# Patient Record
Sex: FEMALE | Race: WHITE | NOT HISPANIC OR LATINO | Employment: FULL TIME
[De-identification: names, ages, dates, MRNs, and addresses within clinical notes are randomized per-mention and may not be internally consistent; named-entity substitution may affect disease eponyms.]

---

## 2017-09-03 ENCOUNTER — HEALTH MAINTENANCE LETTER (OUTPATIENT)
Age: 42
End: 2017-09-03

## 2018-09-10 ENCOUNTER — HEALTH MAINTENANCE LETTER (OUTPATIENT)
Age: 43
End: 2018-09-10

## 2019-10-25 ENCOUNTER — OFFICE VISIT (OUTPATIENT)
Dept: SLEEP MEDICINE | Facility: CLINIC | Age: 44
End: 2019-10-25
Payer: COMMERCIAL

## 2019-10-25 VITALS
HEART RATE: 70 BPM | SYSTOLIC BLOOD PRESSURE: 112 MMHG | OXYGEN SATURATION: 100 % | HEIGHT: 64 IN | WEIGHT: 218.8 LBS | DIASTOLIC BLOOD PRESSURE: 75 MMHG | BODY MASS INDEX: 37.36 KG/M2

## 2019-10-25 DIAGNOSIS — E66.01 MORBID OBESITY (H): ICD-10-CM

## 2019-10-25 DIAGNOSIS — R06.83 PRIMARY SNORING: ICD-10-CM

## 2019-10-25 PROCEDURE — 99204 OFFICE O/P NEW MOD 45 MIN: CPT | Performed by: FAMILY MEDICINE

## 2019-10-25 ASSESSMENT — MIFFLIN-ST. JEOR: SCORE: 1632.47

## 2019-10-28 PROBLEM — R06.83 PRIMARY SNORING: Status: ACTIVE | Noted: 2019-10-28

## 2019-10-28 PROBLEM — E66.01 MORBID OBESITY (H): Status: ACTIVE | Noted: 2019-10-28

## 2019-10-28 NOTE — PROGRESS NOTES
Sleep Consultation:    Date on this visit: 10/25/2019    Jessica Dawson is sent by No ref. provider found for a sleep consultation regarding sleep disordered breathing.    Primary Physician: Kevin Soto     CC: snoring     HPI: Jessica Dawson is a 42 yo female with metabolic syndrome who presents with snoring.     She has been snoring for an unknown duration of time but it has interrupted her bed partners sleep. Snoring worse when laying on back, unclear if improved when laying on side. No daytime fatigue or tiredness. No observed apneic episodes per bed partner. She has light, morning headaches but equates them to her seasonal allergies which are most prominent in the fall.     Mother and father both have HARSHAL. Mother uses CPAP. Father uses oral appliance. No history of dental procedures. No history of hypertension.    Allergies:    Allergies   Allergen Reactions     Nkda [No Known Drug Allergies]        Medications:    Current Outpatient Medications   Medication Sig Dispense Refill     NO ACTIVE MEDICATIONS .       sulfamethoxazole-trimethoprim (BACTRIM,SEPTRA) 400-80 MG per tablet Take 2 tablets by mouth 2 times daily 14 tablet 0       Problem List:  Patient Active Problem List    Diagnosis Date Noted     CARDIOVASCULAR SCREENING; LDL GOAL LESS THAN 160 10/31/2010     Priority: Medium     Metabolic syndrome 10/20/2009     Priority: Medium        Past Medical/Surgical History:  Past Medical History:   Diagnosis Date     MEDICAL HISTORY OF - 9/2000    Lateral femoral cutaneous nerve syndrome. Pre-pregnancy counseling.     Past Surgical History:   Procedure Laterality Date     NO HISTORY OF SURGERY         Social History:  Social History     Socioeconomic History     Marital status:      Spouse name: Jin Dawson     Number of children: 0     Years of education: 14+     Highest education level: Not on file   Occupational History     Occupation: [employer] Supervisor     Employer: [employer]  "  Social Needs     Financial resource strain: Not on file     Food insecurity:     Worry: Not on file     Inability: Not on file     Transportation needs:     Medical: Not on file     Non-medical: Not on file   Tobacco Use     Smoking status: Never Smoker   Substance and Sexual Activity     Alcohol use: Yes     Drug use: No     Sexual activity: Yes     Partners: Male   Lifestyle     Physical activity:     Days per week: Not on file     Minutes per session: Not on file     Stress: Not on file   Relationships     Social connections:     Talks on phone: Not on file     Gets together: Not on file     Attends Orthodox service: Not on file     Active member of club or organization: Not on file     Attends meetings of clubs or organizations: Not on file     Relationship status: Not on file     Intimate partner violence:     Fear of current or ex partner: Not on file     Emotionally abused: Not on file     Physically abused: Not on file     Forced sexual activity: Not on file   Other Topics Concern     Parent/sibling w/ CABG, MI or angioplasty before 65F 55M? Not Asked   Social History Narrative     Not on file       Family History:  Family History   Problem Relation Age of Onset     Diabetes Mother      Allergies Mother      Thyroid Disease Mother         nodule removed from thyroid on LEFT side     Diabetes Father      Respiratory Maternal Grandmother         Emphazema     Alcohol/Drug Maternal Grandfather      Diabetes Paternal Grandfather      Hypertension Sister      Asthma No family hx of      C.A.D. No family hx of      Cerebrovascular Disease No family hx of      Breast Cancer No family hx of      Cancer - colorectal No family hx of      Prostate Cancer No family hx of      Thyroid Disease Other         had thyroid \"shurnk\"/nodule on LEFT side of thyroid removed     Thyroid Disease Other         thyroid \"shrunk\"       Review of Systems:  A complete review of systems reviewed by me is negative with the exeption of " "what has been mentioned in the history of present illness.    Physical Examination:  Vitals: /75   Pulse 70   Ht 1.626 m (5' 4\")   Wt 99.2 kg (218 lb 12.8 oz)   SpO2 100%   BMI 37.56 kg/m    BMI= Body mass index is 37.56 kg/m .    Neck Cir (cm): 38 cm    [place] Total Score 10/25/2019   Total score - [place] 0       NAHOMY Total Score: 3 (10/25/19 0800)    General: well-appearing, no distress  HEENT: normal oropharynx  Resp: lungs clear to auscultation  CV: normal rate, regular rhythm, normal S1,S2 without extra heart sounds    Impression/Plan:    1) Primary Snoring  44 yo female with snoring of unknown duration. STOP BANG of 2. Low concern for HARSHAL. Discussed options to treat primary snoring including oral appliances, flonase, and positional therapy. Will plan for an over the counter oral appliance.    Scribe Disclosure:   ISteven, MS4, am serving as a scribe; to document services personally performed by Dr. Hans De Anda, based on data collection and the provider's statements to me.     Provider Disclosure:  IHans, agree with above History, Review of Systems, Physical exam and Plan.  I have reviewed the content of the documentation and have edited it as needed. I have personally performed the services documented here and the documentation accurately represents those services and the decisions I have made.      I have spent 45 minutes with this patient today in which greater than 50% of this time was spent in the counseling / coordination of care.      Hans De Anda MD      CC: No ref. provider found        "

## 2021-05-26 ENCOUNTER — RECORDS - HEALTHEAST (OUTPATIENT)
Dept: ADMINISTRATIVE | Facility: CLINIC | Age: 46
End: 2021-05-26

## 2023-02-03 ENCOUNTER — ANCILLARY ORDERS (OUTPATIENT)
Dept: FAMILY MEDICINE | Facility: CLINIC | Age: 48
End: 2023-02-03

## 2023-02-03 DIAGNOSIS — Z12.31 VISIT FOR SCREENING MAMMOGRAM: ICD-10-CM

## 2023-02-17 ENCOUNTER — ANCILLARY ORDERS (OUTPATIENT)
Dept: RADIOLOGY | Facility: CLINIC | Age: 48
End: 2023-02-17

## 2023-02-17 ENCOUNTER — ANCILLARY ORDERS (OUTPATIENT)
Dept: FAMILY MEDICINE | Facility: CLINIC | Age: 48
End: 2023-02-17

## 2023-02-17 ENCOUNTER — ANCILLARY PROCEDURE (OUTPATIENT)
Dept: MAMMOGRAPHY | Facility: CLINIC | Age: 48
End: 2023-02-17
Attending: FAMILY MEDICINE
Payer: COMMERCIAL

## 2023-02-17 DIAGNOSIS — Z12.31 VISIT FOR SCREENING MAMMOGRAM: ICD-10-CM

## 2023-02-17 DIAGNOSIS — N64.89 BREAST ASYMMETRY: ICD-10-CM

## 2023-02-17 PROCEDURE — 77067 SCR MAMMO BI INCL CAD: CPT

## 2024-11-08 ENCOUNTER — ANCILLARY PROCEDURE (OUTPATIENT)
Dept: MAMMOGRAPHY | Facility: CLINIC | Age: 49
End: 2024-11-08
Attending: FAMILY MEDICINE
Payer: COMMERCIAL

## 2024-11-08 DIAGNOSIS — Z12.31 VISIT FOR SCREENING MAMMOGRAM: ICD-10-CM

## 2024-11-08 PROCEDURE — 77063 BREAST TOMOSYNTHESIS BI: CPT

## 2024-11-22 ENCOUNTER — TELEPHONE (OUTPATIENT)
Dept: FAMILY MEDICINE | Facility: CLINIC | Age: 49
End: 2024-11-22
Payer: COMMERCIAL

## 2024-11-22 DIAGNOSIS — Z12.11 COLON CANCER SCREENING: Primary | ICD-10-CM

## 2024-11-22 NOTE — TELEPHONE ENCOUNTER
Order/Referral Request    Who is requesting: Patient    Orders being requested: Colonoscopy    Reason service is needed/diagnosis: prevenative    When are orders needed by: ASAP    Has this been discussed with Provider: N/A    Does patient have a preference on a Group/Provider/Facility? [facility]    Does patient have an appointment scheduled?: No    Where to send orders: Place orders within Epic    Okay to leave a detailed message?: Yes at Cell number on file:    Telephone Information:   Mobile [phone]

## 2024-12-04 ENCOUNTER — TRANSCRIBE ORDERS (OUTPATIENT)
Dept: OTHER | Age: 49
End: 2024-12-04

## 2024-12-04 DIAGNOSIS — Z12.11 SPECIAL SCREENING FOR MALIGNANT NEOPLASMS, COLON: Primary | ICD-10-CM

## 2024-12-16 ENCOUNTER — TELEPHONE (OUTPATIENT)
Dept: GASTROENTEROLOGY | Facility: CLINIC | Age: 49
End: 2024-12-16
Payer: COMMERCIAL

## 2024-12-16 NOTE — TELEPHONE ENCOUNTER
Endoscopy Scheduling Screen      What insurance is in the chart?  Other:  [employer]    Ordering/Referring Provider: Kevin zuniga   (If ordering provider performs procedure, schedule with ordering provider unless otherwise instructed. )    BMI: There is no height or weight on file to calculate BMI.     Sedation Ordered  general anesthesia.   BMI<= 45 45 < BMI <= 48 48 < BMI < = 50  BMI > 50   No Restrictions No [facility]  No [facility]  [facility] with exceptions Hospital Only OR Only       Do you have a history of malignant hyperthermia?  No    (Females) Are you currently pregnant?   No     Are you currently on dialysis?   No    Do you need assistance transferring?   No    BMI: There is no height or weight on file to calculate BMI. No BMI on file since 2019 37.56    Is patients BMI > 50?  No    BMI > 40?  No    Do you have a diagnosis of diabetes?  No    Do you take an injectable medication for weight loss or diabetes (excluding insulin)?  No    Do you take the medication Naltrexone?  No    Do you take blood thinners?  No     Prep   Are you currently on dialysis or do you have chronic kidney disease?  No    Do you have a diagnosis of cystic fibrosis (CF)?  No    On a regular basis do you go 3 -5 days between bowel movements?  No    Preferred Pharmacy:  No Pharmacies Listed    Final Scheduling Details     Procedure scheduled  Colonoscopy    Surgeon:  William     Date of procedure:  3-3-25     Location  [place] - Patient preference.    What is your communication preference for Instructions and/or Bowel Prep?   [facility]    Patient Reminders:    You will receive a call from a Nurse to review instructions and health history.  This assessment must be completed prior to your procedure.  Failure to complete the Nurse assessment may result in the procedure being cancelled.       On the day of your procedure, please designate an adult(s) who can drive you home stay with you for the next 24 hours. The medicines used in the exam will make  you sleepy. You will not be able to drive.       You cannot take public transportation, ride share services, or non-medical taxi service without a responsible caregiver.  Medical transport services are allowed with the requirement that a responsible caregiver will receive you at your destination.  We require that drivers and caregivers are confirmed prior to your procedure.

## 2025-02-24 ENCOUNTER — ANESTHESIA EVENT (OUTPATIENT)
Dept: GASTROENTEROLOGY | Facility: CLINIC | Age: 50
End: 2025-02-24
Payer: COMMERCIAL

## 2025-02-24 NOTE — ANESTHESIA PREPROCEDURE EVALUATION
"Anesthesia Pre-Procedure Evaluation    Patient: Jessica Dawson   MRN: [id number] : 1975        Procedure : Procedure(s):  Colonoscopy          Past Medical History:   Diagnosis Date    MEDICAL HISTORY OF 2000    Lateral femoral cutaneous nerve syndrome. Pre-pregnancy counseling.      Past Surgical History:   Procedure Laterality Date    NO HISTORY OF SURGERY        Allergies   Allergen Reactions    Nkda [No Known Drug Allergy]       Social History     Tobacco Use    Smoking status: Never    Smokeless tobacco: Not on file   Substance Use Topics    Alcohol use: Yes      Wt Readings from Last 1 Encounters:   10/25/19 99.2 kg (218 lb 12.8 oz)        Anesthesia Evaluation            ROS/MED HX  ENT/Pulmonary:     (+)     HARSHAL risk factors, snores loudly,                                  Neurologic:       Cardiovascular:       METS/Exercise Tolerance:     Hematologic:       Musculoskeletal:       GI/Hepatic:       Renal/Genitourinary:       Endo: Comment: Metabolic syndrome    (+)               Obesity,       Psychiatric/Substance Use:       Infectious Disease:       Malignancy:       Other:            Physical Exam    Airway        Mallampati: II   TM distance: > 3 FB   Neck ROM: full   Mouth opening: > 3 cm    Respiratory Devices and Support  Comment: Not on oxygen currently       Dental  no notable dental history         Cardiovascular   cardiovascular exam normal          Pulmonary   pulmonary exam normal                OUTSIDE LABS:  CBC:   Lab Results   Component Value Date    HGB 12.6 2007     BMP:   Lab Results   Component Value Date     10/20/2009     2007    POTASSIUM 3.8 10/20/2009    POTASSIUM 3.8 2007    CHLORIDE 104 10/20/2009    CHLORIDE 108 2007    CO2 29 10/20/2009    CO2 27 2007    BUN 8 10/20/2009    BUN 10 2007    CR 0.72 10/20/2009    CR 0.83 2007    GLC 82 10/20/2009    GLC 84 2007     COAGS: No results found for: \"PTT\", \"INR\", " "\"FIBR\"  POC: No results found for: \"BGM\", \"HCG\", \"HCGS\"  HEPATIC: No results found for: \"ALBUMIN\", \"PROTTOTAL\", \"ALT\", \"AST\", \"GGT\", \"ALKPHOS\", \"BILITOTAL\", \"BILIDIRECT\", \"NAINA\"  OTHER:   Lab Results   Component Value Date    A1C 5.6 12/20/2024    ADRIANNE 9.0 10/20/2009    TSH 1.94 12/20/2024       Anesthesia Plan    ASA Status:  2       Anesthesia Type: General.   Induction: Intravenous, Propofol.   Maintenance: TIVA.        Consents    Anesthesia Plan(s) and associated risks, benefits, and realistic alternatives discussed. Questions answered and patient/representative(s) expressed understanding.     - Discussed: Risks, Benefits and Alternatives for BOTH SEDATION and the PROCEDURE were discussed     - Discussed with:  Patient            Postoperative Care    Pain management: IV analgesics, Oral pain medications, Multi-modal analgesia.   PONV prophylaxis: Ondansetron (or other 5HT-3), Dexamethasone or Solumedrol     Comments:    Other Comments: Patient aware of plan, what to expect, and potential risks. Timeout was performed before bringing the patient back to OR, and once again, patient was asked if they had any questions           CLAUDE Baird CRNA    I have reviewed the pertinent notes and labs in the chart from the past 30 days and (re)examined the patient.  Any updates or changes from those notes are reflected in this note.    Clinically Significant Risk Factors Present on Admission                                          "

## 2025-02-27 RX ORDER — THERA TABS 400 MCG
1 TAB ORAL DAILY
COMMUNITY

## 2025-03-03 ENCOUNTER — ANESTHESIA (OUTPATIENT)
Dept: GASTROENTEROLOGY | Facility: CLINIC | Age: 50
End: 2025-03-03
Payer: COMMERCIAL

## 2025-03-03 ENCOUNTER — HOSPITAL ENCOUNTER (OUTPATIENT)
Facility: CLINIC | Age: 50
Discharge: HOME | End: 2025-03-03
Attending: SURGERY | Admitting: SURGERY
Payer: COMMERCIAL

## 2025-03-03 VITALS
DIASTOLIC BLOOD PRESSURE: 115 MMHG | HEIGHT: 64 IN | SYSTOLIC BLOOD PRESSURE: 146 MMHG | HEART RATE: 65 BPM | RESPIRATION RATE: 15 BRPM | TEMPERATURE: 98.1 F | OXYGEN SATURATION: 93 % | WEIGHT: 218 LBS | BODY MASS INDEX: 37.22 KG/M2

## 2025-03-03 LAB — COLONOSCOPY: NORMAL

## 2025-03-03 PROCEDURE — G0121 COLON CA SCRN NOT HI RSK IND: HCPCS | Performed by: SURGERY

## 2025-03-03 PROCEDURE — 258N000003 HC RX IP 258 OP 636: Performed by: PHYSICIAN ASSISTANT

## 2025-03-03 PROCEDURE — 250N000009 HC RX 250: Performed by: PHYSICIAN ASSISTANT

## 2025-03-03 PROCEDURE — 45378 DIAGNOSTIC COLONOSCOPY: CPT | Performed by: SURGERY

## 2025-03-03 PROCEDURE — 370N000017 HC ANESTHESIA TECHNICAL FEE, PER MIN: Performed by: SURGERY

## 2025-03-03 PROCEDURE — 250N000011 HC RX IP 250 OP 636

## 2025-03-03 RX ORDER — SODIUM CHLORIDE, SODIUM LACTATE, POTASSIUM CHLORIDE, CALCIUM CHLORIDE 600; 310; 30; 20 MG/100ML; MG/100ML; MG/100ML; MG/100ML
INJECTION, SOLUTION INTRAVENOUS CONTINUOUS
Status: DISCONTINUED | OUTPATIENT
Start: 2025-03-03 | End: 2025-03-03 | Stop reason: HOSPADM

## 2025-03-03 RX ORDER — LIDOCAINE 40 MG/G
CREAM TOPICAL
Status: DISCONTINUED | OUTPATIENT
Start: 2025-03-03 | End: 2025-03-03 | Stop reason: HOSPADM

## 2025-03-03 RX ORDER — PROPOFOL 10 MG/ML
INJECTION, EMULSION INTRAVENOUS PRN
Status: DISCONTINUED | OUTPATIENT
Start: 2025-03-03 | End: 2025-03-03

## 2025-03-03 RX ORDER — ONDANSETRON 2 MG/ML
4 INJECTION INTRAMUSCULAR; INTRAVENOUS EVERY 30 MIN PRN
Status: DISCONTINUED | OUTPATIENT
Start: 2025-03-03 | End: 2025-03-03 | Stop reason: HOSPADM

## 2025-03-03 RX ORDER — ONDANSETRON 4 MG/1
4 TABLET, ORALLY DISINTEGRATING ORAL EVERY 30 MIN PRN
Status: DISCONTINUED | OUTPATIENT
Start: 2025-03-03 | End: 2025-03-03 | Stop reason: HOSPADM

## 2025-03-03 RX ADMIN — SODIUM CHLORIDE, POTASSIUM CHLORIDE, SODIUM LACTATE AND CALCIUM CHLORIDE 1000 ML: 600; 310; 30; 20 INJECTION, SOLUTION INTRAVENOUS at 07:04

## 2025-03-03 RX ADMIN — PROPOFOL 200 MCG/KG/MIN: 10 INJECTION, EMULSION INTRAVENOUS at 07:29

## 2025-03-03 RX ADMIN — SODIUM CHLORIDE, POTASSIUM CHLORIDE, SODIUM LACTATE AND CALCIUM CHLORIDE: 600; 310; 30; 20 INJECTION, SOLUTION INTRAVENOUS at 07:26

## 2025-03-03 RX ADMIN — PROPOFOL 100 MG: 10 INJECTION, EMULSION INTRAVENOUS at 07:28

## 2025-03-03 RX ADMIN — LIDOCAINE HYDROCHLORIDE 0.1 ML: 10 INJECTION, SOLUTION EPIDURAL; INFILTRATION; INTRACAUDAL; PERINEURAL at 07:04

## 2025-03-03 ASSESSMENT — ACTIVITIES OF DAILY LIVING (ADL)
ADLS_ACUITY_SCORE: 41
ADLS_ACUITY_SCORE: 41

## 2025-03-03 NOTE — PROGRESS NOTES
[facility] DISCHARGE NOTE    Patient discharged to home at 8:26 AM via ambulation. Accompanied by spouse and staff. Discharge instructions reviewed with patient and spouse, opportunity offered to ask questions. Prescriptions - None ordered for discharge. All belongings sent with patient.    Sury Smith RN

## 2025-03-03 NOTE — H&P
[facility]    Pre-Endoscopy History and Physical     Jessica Dawson MRN# [id number]   YOB: 1975 Age: 49 year old     Date of Procedure: 3/3/2025  Primary care provider: Kevin Soto  Type of Endoscopy: Colonoscopy with possible biopsy, possible polypectomy  Reason for Procedure: Screening colonoscopy  Type of Anesthesia Anticipated: Conscious Sedation    HPI:    Jessica is a 49 year old female who will be undergoing the above procedure.  Colon 1st; no famhx of colon ca; no blood thinner     A history and physical has been performed. The patient's medications and allergies have been reviewed. The risks and benefits of the procedure and the sedation options and risks were discussed with the patient.  All questions were answered and informed consent was obtained.      She denies a personal or family history of anesthesia complications or bleeding disorders.    This colonoscopy is purely for screening purposes. Denies abdominal pain, nausea, emesis, diarrhea, constipation, hematochezia, melena.    Patient Active Problem List   Diagnosis    Metabolic syndrome    CARDIOVASCULAR SCREENING; LDL GOAL LESS THAN 160    Primary snoring    Obesity (BMI 35.0-39.9) with comorbidity (H)        Past Medical History:   Diagnosis Date    MEDICAL HISTORY OF - 9/2000    Lateral femoral cutaneous nerve syndrome. Pre-pregnancy counseling.        Past Surgical History:   Procedure Laterality Date    NO HISTORY OF SURGERY         Social History     Tobacco Use    Smoking status: Never    Smokeless tobacco: Not on file   Substance Use Topics    Alcohol use: Yes       Family History   Problem Relation Age of Onset    Diabetes Mother     Allergies Mother     Thyroid Disease Mother         nodule removed from thyroid on LEFT side    Diabetes Father     Respiratory Maternal Grandmother         Emphazema    Alcohol/Drug Maternal Grandfather     Diabetes Paternal Grandfather     Hypertension Sister      "Asthma No family hx of     C.A.D. No family hx of     Cerebrovascular Disease No family hx of     Breast Cancer No family hx of     Cancer - colorectal No family hx of     Prostate Cancer No family hx of     Thyroid Disease Other         had thyroid \"shurnk\"/nodule on LEFT side of thyroid removed    Thyroid Disease Other         thyroid \"shrunk\"       Prior to Admission medications    Medication Sig Start Date End Date Taking? Authorizing Provider   multivitamin, therapeutic (THERA-VIT) TABS tablet Take 1 tablet by mouth daily.   Yes Reported, Patient   NO ACTIVE MEDICATIONS .    Reported, Patient       Allergies   Allergen Reactions    Nkda [No Known Drug Allergy]         REVIEW OF SYSTEMS:   5 point ROS negative except as noted above in HPI, including Gen., Resp., CV, GI &  system review.    PHYSICAL EXAM:   /85 (BP Location: Left arm)   Pulse 81   Temp 98.1  F (36.7  C) (Oral)   Ht 1.626 m (5' 4\")   Wt 98.9 kg (218 lb)   LMP 02/27/2025   SpO2 98%   BMI 37.42 kg/m   Estimated body mass index is 37.42 kg/m  as calculated from the following:    Height as of this encounter: 1.626 m (5' 4\").    Weight as of this encounter: 98.9 kg (218 lb).   Constitutional: Awake, alert, no acute distress.  Eyes: No scleral icterus.  Conjunctiva are without injection.  ENMT: Mucous membranes moist, dentition and gums are intact.   Neck: Soft, supple, trachea midline.    Endocrine: n/a   Lymphatic: There is no cervical, submandibularadenopathy.  Respiratory: normal effortgs   Cardiovascular: S1, S2  Abdomen: Non-distended, non-tender,  No masses,  Musculoskeletal: No spinal or CVA tenderness. Full range of motion in the upper and lower extremities.    Skin: No skin rashes or lesions to inspection.  No petechia.    Neurologic: alerted and oriented 3x  Psychiatric: The patient's affect is not blunted and mood is appropriate.  DIAGNOSTICS:    Not indicated    IMPRESSION   ASA Class 1 - Healthy patient, no medical " problems    PLAN:   Plan for Colonoscopy with possible biopsy, possible polypectomy. We discussed the risks, benefits and alternatives and the patient wished to proceed.  Patient is cleared for the above procedure.    The above has been forwarded to the consulting provider.    Nevin Allen, DO PGY-2  [facility]

## 2025-03-03 NOTE — ANESTHESIA CARE TRANSFER NOTE
Patient: Jessica Dawson    Procedure: Procedure(s):  Colonoscopy       Diagnosis: Special screening for malignant neoplasm of colon [Z12.11]  Diagnosis Additional Information: No value filed.    Anesthesia Type:   No value filed.     Note:    Oropharynx: oropharynx clear of all foreign objects  Level of Consciousness: drowsy      Independent Airway: airway patency satisfactory and stable  Dentition: dentition unchanged  Vital Signs Stable: post-procedure vital signs reviewed and stable  Report to RN Given: handoff report given  Patient transferred to: Phase II    Handoff Report: Identifed the Patient, Identified the Reponsible Provider, Reviewed the pertinent medical history, Discussed the surgical course, Reviewed Intra-OP anesthesia mangement and issues during anesthesia, Set expectations for post-procedure period and Allowed opportunity for questions and acknowledgement of understanding      Vitals:  Vitals Value Taken Time   /60 03/03/25 0754   Temp 36.8  C (98.3  F) 03/03/25 0754   Pulse 68 03/03/25 0754   Resp 15 03/03/25 0754   SpO2 100 % 03/03/25 0758   Vitals shown include unfiled device data.    Electronically Signed By: CLAUDE Baird CRNA  March 3, 2025  7:59 AM

## 2025-03-03 NOTE — ANESTHESIA POSTPROCEDURE EVALUATION
Patient: Jessica Dawson    Procedure: Procedure(s):  Colonoscopy       Anesthesia Type:  No value filed.    Note:  Disposition: Outpatient   Postop Pain Control: Uneventful            Sign Out: Well controlled pain   PONV: No   Neuro/Psych: Uneventful            Sign Out: Acceptable/Baseline neuro status   Airway/Respiratory: Uneventful            Sign Out: Acceptable/Baseline resp. status   CV/Hemodynamics: Uneventful            Sign Out: Acceptable CV status; No obvious hypovolemia; No obvious fluid overload   Other NRE:    DID A NON-ROUTINE EVENT OCCUR?            Last vitals:  Vitals Value Taken Time   /60 03/03/25 0754   Temp 36.8  C (98.3  F) 03/03/25 0754   Pulse 68 03/03/25 0754   Resp 15 03/03/25 0754   SpO2 100 % 03/03/25 0758   Vitals shown include unfiled device data.    Electronically Signed By: CLAUDE Baird CRNA  March 3, 2025  7:59 AM

## (undated) RX ORDER — LIDOCAINE HYDROCHLORIDE 10 MG/ML
INJECTION, SOLUTION EPIDURAL; INFILTRATION; INTRACAUDAL; PERINEURAL
Status: DISPENSED
Start: 2025-03-03